# Patient Record
Sex: FEMALE | Race: WHITE | Employment: OTHER | URBAN - METROPOLITAN AREA
[De-identification: names, ages, dates, MRNs, and addresses within clinical notes are randomized per-mention and may not be internally consistent; named-entity substitution may affect disease eponyms.]

---

## 2017-06-19 ENCOUNTER — HOSPITAL ENCOUNTER (OUTPATIENT)
Dept: MAMMOGRAPHY | Age: 82
Discharge: HOME | End: 2017-06-19
Admitting: INTERNAL MEDICINE

## 2017-06-19 ENCOUNTER — HOSPITAL ENCOUNTER (OUTPATIENT)
Dept: GENERAL RADIOLOGY | Age: 82
Discharge: HOME | End: 2017-06-19

## 2017-06-19 DIAGNOSIS — M81.0 OSTEOPOROSIS, UNSPECIFIED: ICD-10-CM

## 2017-06-19 LAB
A/G RATIO: 1.3 (ref 1.1–2.2)
ALBUMIN SERPL-MCNC: 4 G/DL (ref 3.4–5)
ALP BLD-CCNC: 63 U/L (ref 40–129)
ALT SERPL-CCNC: 9 U/L (ref 10–40)
ANION GAP SERPL CALCULATED.3IONS-SCNC: 15 MMOL/L (ref 3–16)
AST SERPL-CCNC: 15 U/L (ref 15–37)
BILIRUB SERPL-MCNC: <0.2 MG/DL (ref 0–1)
BUN BLDV-MCNC: 23 MG/DL (ref 7–20)
CALCIUM SERPL-MCNC: 8.8 MG/DL (ref 8.3–10.6)
CHLORIDE BLD-SCNC: 103 MMOL/L (ref 99–110)
CHOLESTEROL, TOTAL: 152 MG/DL (ref 0–199)
CO2: 28 MMOL/L (ref 21–32)
CREAT SERPL-MCNC: 0.8 MG/DL (ref 0.6–1.2)
GFR AFRICAN AMERICAN: >60
GFR NON-AFRICAN AMERICAN: >60
GLOBULIN: 3.2 G/DL
GLUCOSE BLD-MCNC: 102 MG/DL (ref 70–99)
HDLC SERPL-MCNC: 52 MG/DL (ref 40–60)
LDL CHOLESTEROL CALCULATED: 62 MG/DL
POTASSIUM SERPL-SCNC: 3.9 MMOL/L (ref 3.5–5.1)
SODIUM BLD-SCNC: 146 MMOL/L (ref 136–145)
TOTAL PROTEIN: 7.2 G/DL (ref 6.4–8.2)
TRIGL SERPL-MCNC: 189 MG/DL (ref 0–150)
VLDLC SERPL CALC-MCNC: 38 MG/DL

## 2019-02-04 ENCOUNTER — HOSPITAL ENCOUNTER (OUTPATIENT)
Dept: GENERAL RADIOLOGY | Age: 84
Discharge: HOME | End: 2019-02-04
Payer: MEDICARE

## 2019-02-04 DIAGNOSIS — M81.0 OSTEOPOROSIS, UNSPECIFIED OSTEOPOROSIS TYPE, UNSPECIFIED PATHOLOGICAL FRACTURE PRESENCE: ICD-10-CM

## 2019-02-04 LAB
A/G RATIO: 1.2 (ref 1.1–2.2)
ALBUMIN SERPL-MCNC: 3.7 G/DL (ref 3.4–5)
ALP BLD-CCNC: 56 U/L (ref 40–129)
ALT SERPL-CCNC: 10 U/L (ref 10–40)
ANION GAP SERPL CALCULATED.3IONS-SCNC: 16 MMOL/L (ref 3–16)
AST SERPL-CCNC: 19 U/L (ref 15–37)
BILIRUB SERPL-MCNC: 0.4 MG/DL (ref 0–1)
BUN BLDV-MCNC: 28 MG/DL (ref 7–20)
CALCIUM SERPL-MCNC: 9.1 MG/DL (ref 8.3–10.6)
CHLORIDE BLD-SCNC: 103 MMOL/L (ref 99–110)
CHOLESTEROL, FASTING: 164 MG/DL (ref 0–199)
CO2: 27 MMOL/L (ref 21–32)
CREAT SERPL-MCNC: 1 MG/DL (ref 0.6–1.2)
CREATININE URINE: 108.7 MG/DL (ref 28–259)
GFR AFRICAN AMERICAN: >60
GFR NON-AFRICAN AMERICAN: 52
GLOBULIN: 3.1 G/DL
GLUCOSE FASTING: 99 MG/DL (ref 70–99)
HDLC SERPL-MCNC: 51 MG/DL (ref 40–60)
LDL CHOLESTEROL CALCULATED: 79 MG/DL
MICROALBUMIN UR-MCNC: <1.2 MG/DL
MICROALBUMIN/CREAT UR-RTO: NORMAL MG/G (ref 0–30)
POTASSIUM SERPL-SCNC: 3.7 MMOL/L (ref 3.5–5.1)
SODIUM BLD-SCNC: 146 MMOL/L (ref 136–145)
TOTAL PROTEIN: 6.8 G/DL (ref 6.4–8.2)
TRIGLYCERIDE, FASTING: 171 MG/DL (ref 0–150)
VLDLC SERPL CALC-MCNC: 34 MG/DL

## 2019-02-04 PROCEDURE — 80053 COMPREHEN METABOLIC PANEL: CPT

## 2019-02-04 PROCEDURE — 82570 ASSAY OF URINE CREATININE: CPT

## 2019-02-04 PROCEDURE — 36415 COLL VENOUS BLD VENIPUNCTURE: CPT

## 2019-02-04 PROCEDURE — 80061 LIPID PANEL: CPT

## 2019-02-04 PROCEDURE — 82043 UR ALBUMIN QUANTITATIVE: CPT

## 2019-02-04 PROCEDURE — 83036 HEMOGLOBIN GLYCOSYLATED A1C: CPT

## 2019-02-04 PROCEDURE — 77080 DXA BONE DENSITY AXIAL: CPT

## 2019-02-05 LAB
ESTIMATED AVERAGE GLUCOSE: 131.2 MG/DL
HBA1C MFR BLD: 6.2 %

## 2024-03-11 ENCOUNTER — TRANSCRIBE ORDERS (OUTPATIENT)
Dept: ADMINISTRATIVE | Age: 89
End: 2024-03-11

## 2024-03-11 DIAGNOSIS — M81.0 POSTMENOPAUSAL OSTEOPOROSIS: Primary | ICD-10-CM

## 2024-04-12 ENCOUNTER — HOSPITAL ENCOUNTER (OUTPATIENT)
Dept: GENERAL RADIOLOGY | Age: 89
Discharge: HOME | End: 2024-04-12
Payer: MEDICARE

## 2024-04-12 ENCOUNTER — APPOINTMENT (OUTPATIENT)
Dept: CT IMAGING | Age: 89
End: 2024-04-12
Payer: MEDICARE

## 2024-04-12 ENCOUNTER — HOSPITAL ENCOUNTER (EMERGENCY)
Age: 89
Discharge: HOME | End: 2024-04-12
Payer: MEDICARE

## 2024-04-12 VITALS
OXYGEN SATURATION: 96 % | HEART RATE: 65 BPM | SYSTOLIC BLOOD PRESSURE: 167 MMHG | TEMPERATURE: 98.2 F | BODY MASS INDEX: 24.75 KG/M2 | RESPIRATION RATE: 16 BRPM | DIASTOLIC BLOOD PRESSURE: 81 MMHG | HEIGHT: 61 IN

## 2024-04-12 DIAGNOSIS — N94.9 ADNEXAL CYST: ICD-10-CM

## 2024-04-12 DIAGNOSIS — N28.1 RENAL CYST: ICD-10-CM

## 2024-04-12 DIAGNOSIS — M81.0 POSTMENOPAUSAL OSTEOPOROSIS: ICD-10-CM

## 2024-04-12 DIAGNOSIS — D25.9 UTERINE LEIOMYOMA, UNSPECIFIED LOCATION: ICD-10-CM

## 2024-04-12 DIAGNOSIS — K57.32 DIVERTICULITIS OF COLON: Primary | ICD-10-CM

## 2024-04-12 DIAGNOSIS — K76.89 LIVER CYST: ICD-10-CM

## 2024-04-12 LAB
ALBUMIN SERPL-MCNC: 3.5 G/DL (ref 3.4–5)
ALBUMIN/GLOB SERPL: 1.3 {RATIO} (ref 1.1–2.2)
ALP SERPL-CCNC: 60 U/L (ref 40–129)
ALT SERPL-CCNC: 9 U/L (ref 10–40)
ANION GAP SERPL CALCULATED.3IONS-SCNC: 8 MMOL/L (ref 3–16)
AST SERPL-CCNC: 17 U/L (ref 15–37)
BASOPHILS # BLD: 0 K/UL (ref 0–0.2)
BASOPHILS NFR BLD: 0.3 %
BILIRUB SERPL-MCNC: 0.3 MG/DL (ref 0–1)
BILIRUB UR QL STRIP.AUTO: NEGATIVE
BUN SERPL-MCNC: 22 MG/DL (ref 7–20)
CALCIUM SERPL-MCNC: 8.7 MG/DL (ref 8.3–10.6)
CHLORIDE SERPL-SCNC: 102 MMOL/L (ref 99–110)
CLARITY UR: ABNORMAL
CO2 SERPL-SCNC: 29 MMOL/L (ref 21–32)
COLOR UR: YELLOW
CREAT SERPL-MCNC: 1 MG/DL (ref 0.6–1.2)
DEPRECATED RDW RBC AUTO: 15.1 % (ref 12.4–15.4)
EOSINOPHIL # BLD: 0.1 K/UL (ref 0–0.6)
EOSINOPHIL NFR BLD: 0.8 %
GFR SERPLBLD CREATININE-BSD FMLA CKD-EPI: 52 ML/MIN/{1.73_M2}
GLUCOSE SERPL-MCNC: 118 MG/DL (ref 70–99)
GLUCOSE UR STRIP.AUTO-MCNC: NEGATIVE MG/DL
HCT VFR BLD AUTO: 31.4 % (ref 36–48)
HGB BLD-MCNC: 10.9 G/DL (ref 12–16)
HGB UR QL STRIP.AUTO: NEGATIVE
KETONES UR STRIP.AUTO-MCNC: NEGATIVE MG/DL
LACTATE BLDV-SCNC: 0.7 MMOL/L (ref 0.4–1.9)
LEUKOCYTE ESTERASE UR QL STRIP.AUTO: NEGATIVE
LIPASE SERPL-CCNC: 37 U/L (ref 13–60)
LYMPHOCYTES # BLD: 1.7 K/UL (ref 1–5.1)
LYMPHOCYTES NFR BLD: 22.8 %
MCH RBC QN AUTO: 29.6 PG (ref 26–34)
MCHC RBC AUTO-ENTMCNC: 34.9 G/DL (ref 31–36)
MCV RBC AUTO: 84.9 FL (ref 80–100)
MONOCYTES # BLD: 0.5 K/UL (ref 0–1.3)
MONOCYTES NFR BLD: 6.5 %
NEUTROPHILS # BLD: 5.3 K/UL (ref 1.7–7.7)
NEUTROPHILS NFR BLD: 69.6 %
NITRITE UR QL STRIP.AUTO: NEGATIVE
PH UR STRIP.AUTO: 7 [PH] (ref 5–8)
PLATELET # BLD AUTO: 190 K/UL (ref 135–450)
PMV BLD AUTO: 8.5 FL (ref 5–10.5)
POTASSIUM SERPL-SCNC: 3.9 MMOL/L (ref 3.5–5.1)
PROT SERPL-MCNC: 6.3 G/DL (ref 6.4–8.2)
PROT UR STRIP.AUTO-MCNC: NEGATIVE MG/DL
RBC # BLD AUTO: 3.69 M/UL (ref 4–5.2)
SODIUM SERPL-SCNC: 139 MMOL/L (ref 136–145)
SP GR UR STRIP.AUTO: 1.01 (ref 1–1.03)
UA COMPLETE W REFLEX CULTURE PNL UR: ABNORMAL
UA DIPSTICK W REFLEX MICRO PNL UR: ABNORMAL
URN SPEC COLLECT METH UR: ABNORMAL
UROBILINOGEN UR STRIP-ACNC: 0.2 E.U./DL
WBC # BLD AUTO: 7.6 K/UL (ref 4–11)

## 2024-04-12 PROCEDURE — 85025 COMPLETE CBC W/AUTO DIFF WBC: CPT

## 2024-04-12 PROCEDURE — 83605 ASSAY OF LACTIC ACID: CPT

## 2024-04-12 PROCEDURE — 77080 DXA BONE DENSITY AXIAL: CPT

## 2024-04-12 PROCEDURE — 6360000004 HC RX CONTRAST MEDICATION: Performed by: REGISTERED NURSE

## 2024-04-12 PROCEDURE — 83690 ASSAY OF LIPASE: CPT

## 2024-04-12 PROCEDURE — 80053 COMPREHEN METABOLIC PANEL: CPT

## 2024-04-12 PROCEDURE — 36415 COLL VENOUS BLD VENIPUNCTURE: CPT

## 2024-04-12 PROCEDURE — 74177 CT ABD & PELVIS W/CONTRAST: CPT

## 2024-04-12 PROCEDURE — 81003 URINALYSIS AUTO W/O SCOPE: CPT

## 2024-04-12 PROCEDURE — 99285 EMERGENCY DEPT VISIT HI MDM: CPT

## 2024-04-12 RX ORDER — AMOXICILLIN AND CLAVULANATE POTASSIUM 875; 125 MG/1; MG/1
1 TABLET, FILM COATED ORAL 2 TIMES DAILY
Qty: 14 TABLET | Refills: 0 | Status: SHIPPED | OUTPATIENT
Start: 2024-04-12 | End: 2024-04-19

## 2024-04-12 RX ADMIN — IOPAMIDOL 75 ML: 755 INJECTION, SOLUTION INTRAVENOUS at 14:49

## 2024-04-12 ASSESSMENT — ENCOUNTER SYMPTOMS
CHEST TIGHTNESS: 0
CONSTIPATION: 1
SHORTNESS OF BREATH: 0
ABDOMINAL PAIN: 1
RECTAL PAIN: 0
VOMITING: 0
DIARRHEA: 0
BLOOD IN STOOL: 0

## 2024-04-12 ASSESSMENT — PAIN - FUNCTIONAL ASSESSMENT: PAIN_FUNCTIONAL_ASSESSMENT: NONE - DENIES PAIN

## 2024-04-12 NOTE — ED PROVIDER NOTES
[facility] ED  EMERGENCY DEPARTMENT ENCOUNTER        Pt Name: Jyoti Engle  MRN: [id number]  Birthdate 7/2/1930  Date of evaluation: 4/12/2024  Provider: JOAN Pond - CNP  PCP: Tor Hamilton MD    This patient was not seen and evaluated by the attending physician No att. providers found.    I have evaluated this patient. My supervising physician was available for consultation.      CHIEF COMPLAINT       Chief Complaint   Patient presents with    Constipation     Patient reports having difficulty having a bowel movement. Had to strain and only a small amount came out. Scant amount of bright red blood with BM       HISTORY OF PRESENT ILLNESS   (Location/Symptom, Timing/Onset, Context/Setting, Quality, Duration, Modifying Factors, Severity)  Note limiting factors.     History from : Patient and Family patients daughter  Jyoti Engle is a 89 y.o. female who presents via private car for  abdominal bloating, blood in urine, constipation. Onset was two days ago. Duration has been worsening since the onset. Context includes patient presents to the emergency department today with her daughter.  Patient is hearing impaired but can read lips.  She does state that she has had some lower abdominal pain for the last 2 days with some feelings of bloating and constipation.  She does state that this morning she had some blood in her urine and she feels like she is straining more with bowel movements.  She denies any chest pain, palpitations.  She denies shortness of breath, cough congestion or fevers.  She does endorse generalized lower abdominal pain but denies nausea, vomiting.  She denies blood in her stool.  She denies dysuria, urgency, frequency or flank pain. Quality is bloating, dull with radiation to her lower abdomen. n used for pain today.     Chart review reveals pt has significant PMHx of anemia, arthritis, hyperlipidemia, hypertension, chronic kidney disease. They take

## 2024-04-12 NOTE — DISCHARGE INSTRUCTIONS
Your CT scan noted multiple cysts to the kidney, liver, uterus and an adnexal cyst or possibly ovarian cyst, they should be followed by your primary care physician.

## 2024-04-17 ENCOUNTER — HOSPITAL ENCOUNTER (EMERGENCY)
Age: 89
Discharge: HOME | End: 2024-04-17
Attending: EMERGENCY MEDICINE
Payer: MEDICARE

## 2024-04-17 ENCOUNTER — APPOINTMENT (OUTPATIENT)
Dept: GENERAL RADIOLOGY | Age: 89
End: 2024-04-17
Payer: MEDICARE

## 2024-04-17 VITALS
BODY MASS INDEX: 23.22 KG/M2 | HEART RATE: 71 BPM | SYSTOLIC BLOOD PRESSURE: 162 MMHG | TEMPERATURE: 97.6 F | HEIGHT: 61 IN | OXYGEN SATURATION: 92 % | WEIGHT: 123 LBS | DIASTOLIC BLOOD PRESSURE: 68 MMHG | RESPIRATION RATE: 16 BRPM

## 2024-04-17 DIAGNOSIS — K59.00 CONSTIPATION, UNSPECIFIED CONSTIPATION TYPE: Primary | ICD-10-CM

## 2024-04-17 DIAGNOSIS — K57.32 DIVERTICULITIS OF COLON: ICD-10-CM

## 2024-04-17 PROCEDURE — 99283 EMERGENCY DEPT VISIT LOW MDM: CPT

## 2024-04-17 PROCEDURE — 74018 RADEX ABDOMEN 1 VIEW: CPT

## 2024-04-17 PROCEDURE — 6370000000 HC RX 637 (ALT 250 FOR IP): Performed by: EMERGENCY MEDICINE

## 2024-04-17 RX ORDER — POLYETHYLENE GLYCOL 3350 17 G/17G
17 POWDER, FOR SOLUTION ORAL DAILY PRN
Qty: 510 G | Refills: 0 | Status: SHIPPED | OUTPATIENT
Start: 2024-04-17 | End: 2024-05-17

## 2024-04-17 RX ORDER — POLYETHYLENE GLYCOL 3350 17 G/17G
17 POWDER, FOR SOLUTION ORAL ONCE
Status: COMPLETED | OUTPATIENT
Start: 2024-04-17 | End: 2024-04-17

## 2024-04-17 RX ORDER — DOCUSATE SODIUM 100 MG/1
100 CAPSULE, LIQUID FILLED ORAL 2 TIMES DAILY
Qty: 60 CAPSULE | Refills: 0 | Status: SHIPPED | OUTPATIENT
Start: 2024-04-17 | End: 2024-05-17

## 2024-04-17 RX ADMIN — POLYETHYLENE GLYCOL 3350 17 G: 17 POWDER, FOR SOLUTION ORAL at 22:56

## 2024-04-17 RX ADMIN — DOCUSATE SODIUM 100 MG: 50 LIQUID ORAL at 22:56

## 2024-04-17 ASSESSMENT — ENCOUNTER SYMPTOMS
CONSTIPATION: 1
RHINORRHEA: 0
VOMITING: 0
DIARRHEA: 0
BACK PAIN: 0
COUGH: 0
NAUSEA: 0
EYE REDNESS: 0
SHORTNESS OF BREATH: 0
EYE PAIN: 0
ABDOMINAL PAIN: 0

## 2024-04-17 ASSESSMENT — LIFESTYLE VARIABLES
HOW OFTEN DO YOU HAVE A DRINK CONTAINING ALCOHOL: NEVER
HOW MANY STANDARD DRINKS CONTAINING ALCOHOL DO YOU HAVE ON A TYPICAL DAY: PATIENT DOES NOT DRINK

## 2024-04-18 NOTE — ED PROVIDER NOTES
[facility] ED  EMERGENCY DEPARTMENT ENCOUNTER        Pt Name: Jyoti Engle  MRN: [id number]  Birthdate 7/2/1930  Date of evaluation: 4/17/2024  Provider: Candice Morales DO  PCP: Tor Hamilton MD  Note Started: 9:16 PM EDT 4/17/24    CHIEF COMPLAINT      Rectal Fullness    HISTORY OF PRESENT ILLNESS: 1 or more Elements     Chief Complaint   Patient presents with    Rectal issues     Pt arrives from home with concerns that her \"rectum is falling out\". Pts daughter reports pt was seen Friday for the same thing and seen by PCP this am      History from : Patient  Limitations to history : None    Jyoti Engle is a 89 y.o. female who presents to the emergency department secondary to concern for rectal \"fullness\".  Reports that she feels like her rectum is falling out.  She describes this as feeling like that she needs to use the bathroom, but nothing is coming out.  Patient was recently diagnosed with diverticulitis last Friday and has been ongoing treatment since that time. At time of diagnosis of diverticulitis, she was provided with a prescription for Augmentin which she has been compliant with.  She denies taking any stool softeners    Past medical history noted below. Aside from what is stated above denies any other symptoms or modifying factors.   reports that she has never smoked. She does not have any smokeless tobacco history on file.  Nursing Notes were all reviewed and agreed with or any disagreements addressed in HPI/MDM.  REVIEW OF SYSTEMS :    Review of Systems   Constitutional:  Negative for chills and fever.   HENT:  Negative for congestion and rhinorrhea.    Eyes:  Negative for pain and redness.   Respiratory:  Negative for cough and shortness of breath.    Cardiovascular:  Negative for chest pain and leg swelling.   Gastrointestinal:  Positive for constipation. Negative for abdominal pain, diarrhea, nausea and vomiting.   Genitourinary:  Negative for frequency and

## 2024-04-23 ENCOUNTER — TELEPHONE (OUTPATIENT)
Dept: CASE MANAGEMENT | Age: 89
End: 2024-04-23

## 2024-04-23 NOTE — TELEPHONE ENCOUNTER
Imaging report CT Abd 4/12/24 with f/u imaging recommendations sent to Tor BERGER(R)  Patient Navigator  Incidentals/Lung Navigation  [email or web address]

## 2024-11-20 ENCOUNTER — HOSPITAL ENCOUNTER (OUTPATIENT)
Dept: GENERAL RADIOLOGY | Age: 89
Discharge: HOME | End: 2024-11-20
Payer: MEDICARE

## 2024-11-20 DIAGNOSIS — R05.1 ACUTE COUGH: ICD-10-CM

## 2024-11-20 PROCEDURE — 71046 X-RAY EXAM CHEST 2 VIEWS: CPT
